# Patient Record
Sex: FEMALE | Race: WHITE | NOT HISPANIC OR LATINO | Employment: OTHER | URBAN - METROPOLITAN AREA
[De-identification: names, ages, dates, MRNs, and addresses within clinical notes are randomized per-mention and may not be internally consistent; named-entity substitution may affect disease eponyms.]

---

## 2024-01-02 ENCOUNTER — TELEPHONE (OUTPATIENT)
Dept: CARDIOLOGY | Facility: CLINIC | Age: 69
End: 2024-01-02

## 2024-01-02 NOTE — TELEPHONE ENCOUNTER
Caller: Rima Cardona    Relationship: Self    Best call back number: [phone]    What was the call regarding: PT CALLED IN STATING SHE HAS A NEW PT APPT ON 1.9.24 AND SHE HAD RECEIVED A TEXT TO GET REGISTRATION FILLED OUT, PT CAN'T SEE AND WONT BE FILLING THAT OUT OVER THE PHONE, SHE JUST WANTED TO MAKE US AWARE.     Is it okay if the provider responds through MyChart:

## 2024-01-09 ENCOUNTER — OFFICE VISIT (OUTPATIENT)
Dept: CARDIOLOGY | Facility: CLINIC | Age: 69
End: 2024-01-09
Payer: MEDICARE

## 2024-01-09 VITALS
BODY MASS INDEX: 31.47 KG/M2 | SYSTOLIC BLOOD PRESSURE: 174 MMHG | DIASTOLIC BLOOD PRESSURE: 90 MMHG | WEIGHT: 177.6 LBS | HEIGHT: 63 IN | HEART RATE: 65 BPM

## 2024-01-09 DIAGNOSIS — I10 HYPERTENSION, ESSENTIAL: ICD-10-CM

## 2024-01-09 DIAGNOSIS — I48.0 PAF (PAROXYSMAL ATRIAL FIBRILLATION): Primary | ICD-10-CM

## 2024-01-09 PROCEDURE — 99204 OFFICE O/P NEW MOD 45 MIN: CPT | Performed by: INTERNAL MEDICINE

## 2024-01-09 PROCEDURE — 1159F MED LIST DOCD IN RCRD: CPT | Performed by: INTERNAL MEDICINE

## 2024-01-09 PROCEDURE — 1160F RVW MEDS BY RX/DR IN RCRD: CPT | Performed by: INTERNAL MEDICINE

## 2024-01-09 RX ORDER — CHLORTHALIDONE 25 MG/1
25 TABLET ORAL DAILY
COMMUNITY

## 2024-01-09 RX ORDER — ACETAMINOPHEN 80 MG/1
80 TABLET, CHEWABLE ORAL EVERY 4 HOURS PRN
COMMUNITY

## 2024-01-09 RX ORDER — POTASSIUM CHLORIDE 20MEQ/15ML
LIQUID (ML) ORAL DAILY
COMMUNITY

## 2024-01-09 RX ORDER — ALENDRONATE SODIUM 70 MG/75ML
70 SOLUTION ORAL
COMMUNITY

## 2024-01-09 RX ORDER — LATANOPROST 50 UG/ML
1 SOLUTION/ DROPS OPHTHALMIC NIGHTLY
COMMUNITY
Start: 2023-11-28

## 2024-01-09 RX ORDER — MELATONIN 10 MG
CAPSULE ORAL
COMMUNITY

## 2024-01-09 RX ORDER — INSULIN GLARGINE 300 U/ML
6 INJECTION, SOLUTION SUBCUTANEOUS DAILY
COMMUNITY

## 2024-01-09 RX ORDER — METOPROLOL SUCCINATE 50 MG/1
50 TABLET, EXTENDED RELEASE ORAL DAILY
COMMUNITY

## 2024-01-09 NOTE — PROGRESS NOTES
Chief Complaint  Atrial Fibrillation and new patient    Subjective            Rima Cardona presents to [facility] CARDIOLOG[facility]  Atrial Fibrillation  Symptoms include palpitations and shortness of breath. Symptoms are negative for chest pain. Past medical history includes atrial fibrillation.       68-year-old white female.  She has no previous cardiac history.  She had a routine follow-up with primary care recently but had not been feeling well over the past couple of weeks.  She reported fatigue, a little bit of cough and just feeling tired.  She had an EKG at that visit which showed atrial fibrillation with rapid rates.  She was nonspecifically having palpitations.  She was started on Eliquis and Toprol at that point.  She has felt a bit better but did experience some palpitations yesterday.  Her home blood pressure readings have been reasonably well-controlled and a couple of times her digital cuff caught her pulse rate in the 120s to 140s but the rest of the last couple weeks it has been in 50s to 70s.    PMH  Past Medical History:   Diagnosis Date    Atrial fibrillation     Diabetes mellitus          SURGICALHX  History reviewed. No pertinent surgical history.     SOC  Social History     Socioeconomic History    Marital status:    Tobacco Use    Smoking status: Former     Types: Cigarettes     Quit date:      Years since quittin.0    Smokeless tobacco: Never   Vaping Use    Vaping Use: Never used   Substance and Sexual Activity    Alcohol use: Never    Drug use: Never    Sexual activity: Defer         FAMHX  Family History   Problem Relation Age of Onset    Cancer Mother     No Known Problems Father           ALLERGY  Allergies   Allergen Reactions    Cefaclor Diarrhea and Other (See Comments)        MEDSCURRENT    Current Outpatient Medications:     acetaminophen (TYLENOL) 80 MG chewable tablet, Chew 1 tablet Every 4 (Four) Hours As Needed for Mild Pain., Disp: , Rfl:      "alendronate (FOSAMAX) 70 MG/75ML solution, Take 75 mL by mouth Every 7 (Seven) Days., Disp: , Rfl:     ALLOPURINOL PO, Take 50 mg by mouth 1 (One) Time., Disp: , Rfl:     apixaban (ELIQUIS) 5 MG tablet tablet, Take 1 tablet by mouth 2 (Two) Times a Day., Disp: , Rfl:     chlorthalidone (HYGROTON) 25 MG tablet, Take 1 tablet by mouth Daily., Disp: , Rfl:     Insulin Glargine, 2 Unit Dial, (Toujeo Max SoloStar) 300 UNIT/ML solution pen-injector injection, Inject 6 Units under the skin into the appropriate area as directed Daily., Disp: , Rfl:     latanoprost (XALATAN) 0.005 % ophthalmic solution, Administer 1 drop to both eyes Every Night., Disp: , Rfl:     Melatonin 10 MG capsule, Take  by mouth., Disp: , Rfl:     metoprolol succinate XL (TOPROL-XL) 50 MG 24 hr tablet, Take 1 tablet by mouth Daily., Disp: , Rfl:     potassium chloride (KAYCIEL) 20 mEq/15 mL solution, Take  by mouth Daily., Disp: , Rfl:       Review of Systems   Constitutional: Positive for malaise/fatigue.   HENT: Negative.     Eyes: Negative.    Cardiovascular:  Positive for dyspnea on exertion and palpitations. Negative for chest pain.   Respiratory:  Positive for shortness of breath.    Endocrine: Negative.    Hematologic/Lymphatic: Negative.    Skin: Negative.    Musculoskeletal: Negative.    Gastrointestinal: Negative.    Genitourinary: Negative.    Neurological: Negative.    Psychiatric/Behavioral: Negative.          Objective     /90   Pulse 65   Ht 160 cm (63\")   Wt 80.6 kg (177 lb 9.6 oz)   BMI 31.46 kg/m²       General Appearance:   well developed  well nourished  HENT:   oropharynx moist  lips not cyanotic  Neck:  thyroid not enlarged  supple  Respiratory:  no respiratory distress  normal breath sounds  no rales  Cardiovascular:  no jugular venous distention  regular rhythm  apical impulse normal  S1 normal, S2 normal  no S3, no S4   no murmur  no rub, no thrill  carotid pulses normal; no bruit  pedal pulses normal  lower " extremity edema: none    Musculoskeletal:  no clubbing of fingers.   normocephalic, head atraumatic  Skin:   warm, dry  Psychiatric:  judgement and insight appropriate  normal mood and affect      Result Review :             Data reviewed : Primary care records reviewed, laboratory studies reviewed, TSH normal, , creatinine 1.3, BUN 36.  EKG December 2023 showed atrial fibrillation with rapid ventricular rates       Procedures             Assessment and Plan        ASSESSMENT:  Encounter Diagnoses   Name Primary?    PAF (paroxysmal atrial fibrillation) Yes    Hypertension, essential          PLAN:    1.  Paroxysmal atrial fibrillation-she is clinically in sinus rhythm today but has probably had a couple of breakthroughs over the past couple of weeks.  A 30-day event monitor will be scheduled to identify how much atrial fibrillation she is having.  For now continue the beta-blocker and long-term Eliquis.  An echo will be scheduled to evaluate for structural abnormalities.  2.  Essential hypertension-Home readings are reasonably well-controlled.  The patient is quite anxious about the diagnosis of atrial fibrillation.  I told her to continue to monitor her blood pressure at home and she will do a follow-up here in the office in about 6 weeks after her diagnostics.  She is agreeable with this plan  3.  I have encouraged her to return to her normal activities otherwise at this point.  She had been avoiding physical activity and walking because of the diagnosis of atrial fibrillation.          Patient was given instructions and counseling regarding her condition or for health maintenance advice. Please see specific information pulled into the AVS if appropriate.             VJ Jolly MD  1/9/2024    14:23 EST

## 2024-01-10 ENCOUNTER — TELEPHONE (OUTPATIENT)
Dept: CARDIOLOGY | Facility: CLINIC | Age: 69
End: 2024-01-10
Payer: MEDICARE

## 2024-01-10 NOTE — TELEPHONE ENCOUNTER
Received notification of serious event occurring on 1/10/2024 at 9:32 AM. Event is identified as afib with maximum heart rate of 147 bpm.

## 2024-01-10 NOTE — TELEPHONE ENCOUNTER
Yes, she is having another episode of atrial fibrillation.  We discussed this in the office just yesterday.  I want her to take an extra 25 mg of metoprolol now, and starting tomorrow take 50 mg in the morning and 25 mg in the evening every day.  She is already on Eliquis which she will continue.    Follow-up as scheduled

## 2024-01-10 NOTE — TELEPHONE ENCOUNTER
TERE patient. Patient states at time she was getting dressed and her heart started racing. Patient states now she is very fatigued. Patient states she took her Toprol 50 mg this AM

## 2024-01-22 ENCOUNTER — TELEPHONE (OUTPATIENT)
Dept: CARDIOLOGY | Facility: CLINIC | Age: 69
End: 2024-01-22
Payer: MEDICARE

## 2024-01-22 NOTE — TELEPHONE ENCOUNTER
Received notification of serious events, both occurring on 1/22/2024. Events are both identified as atrial fibrillation and are outlined below.    1:24 PM - maximum heart rate 158 bpm, no duration specified    1:31 PM - maximum heart rate 164 bpm, no duration specified    The patient has a history of afib, which is the indication for the study.

## 2024-01-23 RX ORDER — METOPROLOL SUCCINATE 50 MG/1
TABLET, EXTENDED RELEASE ORAL
Qty: 225 TABLET | Refills: 3 | Status: SHIPPED | OUTPATIENT
Start: 2024-01-23

## 2024-01-23 NOTE — TELEPHONE ENCOUNTER
TERE patient. Patient states her heart was racing and fluttering. Patient states it feels like they are lasting longer.  Patient takes:  Toprol 50 mg AM 25 mg PM  Eliquis 5 mg BID

## 2024-01-23 NOTE — TELEPHONE ENCOUNTER
Caller: Rima Cardona    Relationship: Self    Best call back number: [phone] (home)      What is the best time to reach you: ANYTIME     Who are you requesting to speak with (clinical staff, provider,  specific staff member): CLINICAL     Do you know the name of the person who called: KATHIE     What was the call regarding: FOLLOW UP FROM EPISODE PATIENT HAD ON [email or web address].HUB TRANSFERRED PATIENT TO OFFICE AS WEL    Is it okay if the provider responds through MyChart: NO

## 2024-01-26 ENCOUNTER — TELEPHONE (OUTPATIENT)
Dept: CARDIOLOGY | Facility: CLINIC | Age: 69
End: 2024-01-26
Payer: MEDICARE

## 2024-01-26 DIAGNOSIS — I48.0 PAF (PAROXYSMAL ATRIAL FIBRILLATION): ICD-10-CM

## 2024-01-29 ENCOUNTER — TELEPHONE (OUTPATIENT)
Dept: CARDIOLOGY | Facility: CLINIC | Age: 69
End: 2024-01-29
Payer: MEDICARE

## 2024-01-29 NOTE — TELEPHONE ENCOUNTER
----- Message from VJ Jolly MD sent at 1/27/2024  2:02 PM EST -----  Echo reviewed  Heart function was normal  Valve function was normal    Await event monitor  Otherwise follow-up as scheduled

## 2024-01-30 ENCOUNTER — TELEPHONE (OUTPATIENT)
Dept: CARDIOLOGY | Facility: CLINIC | Age: 69
End: 2024-01-30
Payer: MEDICARE

## 2024-01-30 NOTE — TELEPHONE ENCOUNTER
RECEIVED NOTIFICATION FROM [employer] REQUESTING MISSING INFORMATION.  RE FAXED APPLICATION.  WAITING ON DETERMINATION.

## 2024-02-15 ENCOUNTER — TELEPHONE (OUTPATIENT)
Dept: CARDIOLOGY | Facility: CLINIC | Age: 69
End: 2024-02-15
Payer: MEDICARE

## 2024-02-19 ENCOUNTER — OFFICE VISIT (OUTPATIENT)
Dept: CARDIOLOGY | Facility: CLINIC | Age: 69
End: 2024-02-19
Payer: MEDICARE

## 2024-02-19 VITALS
WEIGHT: 177 LBS | BODY MASS INDEX: 31.36 KG/M2 | OXYGEN SATURATION: 98 % | HEIGHT: 63 IN | SYSTOLIC BLOOD PRESSURE: 148 MMHG | DIASTOLIC BLOOD PRESSURE: 83 MMHG | HEART RATE: 60 BPM

## 2024-02-19 DIAGNOSIS — I10 HYPERTENSION, ESSENTIAL: ICD-10-CM

## 2024-02-19 DIAGNOSIS — I48.0 PAF (PAROXYSMAL ATRIAL FIBRILLATION): Primary | ICD-10-CM

## 2024-02-19 PROCEDURE — 99214 OFFICE O/P EST MOD 30 MIN: CPT | Performed by: NURSE PRACTITIONER

## 2024-02-19 PROCEDURE — G2211 COMPLEX E/M VISIT ADD ON: HCPCS | Performed by: NURSE PRACTITIONER

## 2024-02-19 PROCEDURE — 1159F MED LIST DOCD IN RCRD: CPT | Performed by: NURSE PRACTITIONER

## 2024-02-19 PROCEDURE — 1160F RVW MEDS BY RX/DR IN RCRD: CPT | Performed by: NURSE PRACTITIONER

## 2024-02-19 RX ORDER — METOPROLOL SUCCINATE 50 MG/1
TABLET, EXTENDED RELEASE ORAL
Start: 2024-02-19

## 2024-02-19 RX ORDER — METOPROLOL SUCCINATE 100 MG/1
100 TABLET, EXTENDED RELEASE ORAL DAILY
Start: 2024-02-19

## 2024-02-19 NOTE — PROGRESS NOTES
Chief Complaint  Atrial Fibrillation and Follow-up (6 week )    Subjective            Rima Cardona presents to [facility] CARDIOLOG[facility]  History of Present Illness    Ms. Bustillos is here today for follow-up evaluation management of paroxysmal atrial fibrillation, essential hypertension.  She recently presented to primary care for routine annual follow-up, she had an EKG completed which showed atrial fibrillation with rapid rates.  Since that time she has been started on Eliquis and Toprol.  She wore a 30-day event monitor recently which showed an overall 1.9% atrial fibrillation burden, longest episode up to 15 minutes.  2 symptoms correlated to A-fib.  Echocardiogram showed normal LV systolic function, normal valve function.  Her metoprolol has been adjusted however she continues to have breakthrough episodes.  She reports about 2 in the last 2 weeks.  1 of those seem to have been triggered by a very stressful phone conversation.  She is compliant with her medical therapy and has no bleeding problems on anticoagulation.  She is overall very anxious regarding the diagnosis.  She has limited her activity quite a bit, she is eating poorly, and has gained 20 pounds.    PMH  Past Medical History:   Diagnosis Date    Atrial fibrillation     Diabetes mellitus          SURGICALHX  History reviewed. No pertinent surgical history.     SOC  Social History     Socioeconomic History    Marital status:    Tobacco Use    Smoking status: Former     Types: Cigarettes     Quit date:      Years since quittin.1    Smokeless tobacco: Never   Vaping Use    Vaping Use: Never used   Substance and Sexual Activity    Alcohol use: Never    Drug use: Never    Sexual activity: Defer         FAMHX  Family History   Problem Relation Age of Onset    Cancer Mother     No Known Problems Father           ALLERGY  Allergies   Allergen Reactions    Cefaclor Diarrhea and Other (See Comments)     "    MEDSCURRENT    Current Outpatient Medications:     acetaminophen (TYLENOL) 80 MG chewable tablet, Chew 1 tablet Every 4 (Four) Hours As Needed for Mild Pain., Disp: , Rfl:     alendronate (FOSAMAX) 70 MG/75ML solution, Take 75 mL by mouth Every 7 (Seven) Days., Disp: , Rfl:     ALLOPURINOL PO, Take 50 mg by mouth 1 (One) Time., Disp: , Rfl:     apixaban (ELIQUIS) 5 MG tablet tablet, Take 1 tablet by mouth 2 (Two) Times a Day., Disp: , Rfl:     chlorthalidone (HYGROTON) 25 MG tablet, Take 1 tablet by mouth Daily., Disp: , Rfl:     Insulin Glargine, 2 Unit Dial, (Toujeo Max SoloStar) 300 UNIT/ML solution pen-injector injection, Inject 6 Units under the skin into the appropriate area as directed Daily., Disp: , Rfl:     latanoprost (XALATAN) 0.005 % ophthalmic solution, Administer 1 drop to both eyes Every Night., Disp: , Rfl:     Melatonin 10 MG capsule, Take  by mouth., Disp: , Rfl:     metoprolol succinate XL (TOPROL-XL) 50 MG 24 hr tablet, 75 mg by mouth in morning 50 mg by mouth at night, Disp: 225 tablet, Rfl: 3    potassium chloride (KAYCIEL) 20 mEq/15 mL solution, Take  by mouth Daily., Disp: , Rfl:       Review of Systems   Cardiovascular:  Positive for irregular heartbeat and palpitations. Negative for chest pain, dyspnea on exertion, near-syncope and syncope.   Hematologic/Lymphatic: Negative for bleeding problem.        Objective     /87   Pulse 62   Ht 160 cm (63\")   Wt 80.3 kg (177 lb)   SpO2 99%   BMI 31.35 kg/m²       General Appearance:   well developed  well nourished  HENT:   oropharynx moist  lips not cyanotic  Neck:  thyroid not enlarged  supple  Respiratory:  no respiratory distress  normal breath sounds  no rales  Cardiovascular:  no jugular venous distention  regular rhythm  apical impulse normal  S1 normal, S2 normal  no S3, no S4   no murmur  no rub, no thrill  carotid pulses normal; no bruit  pedal pulses normal  lower extremity edema: none    Musculoskeletal:  no clubbing of " fingers.   normocephalic, head atraumatic  Skin:   warm, dry  Psychiatric:  judgement and insight appropriate  normal mood and affect      Result Review :     The following data was reviewed by: MANUEL Thrasher on 02/19/2024:              Data reviewed : Cardiology studies echo and event monitor reviewed above.      Procedures      Rima Cardona  reports that she quit smoking about 28 years ago. Her smoking use included cigarettes. She has never used smokeless tobacco.. I have educated her on the risk of diseases from using tobacco products such as cancer, COPD, and heart disease.              Assessment and Plan        ASSESSMENT:  Encounter Diagnoses   Name Primary?    PAF (paroxysmal atrial fibrillation) Yes    Hypertension, essential          PLAN:    1.  Paroxysmal atrial fibrillation-overall burden low on event monitor however she has occasional breakthrough episodes still.  We discussed increasing her morning Toprol dose 100 mg and she will continue 50 in the evening.  It seems like the episodes are occurring most often midday so this may offset that a bit.  She has become very sedentary secondary to this diagnosis, we discussed that she will resume her normal activities, exercise, etc.  Before this she was very active overall.  She is also going to work on her diet and intends to lose weight.  Continue anticoagulation.  No structural concerns on echo.  2.  Essential hypertension-slightly elevated today.  Increasing Toprol should help with this.  3.  Follow-up in 6 weeks          Patient was given instructions and counseling regarding her condition or for health maintenance advice. Please see specific information pulled into the AVS if appropriate.           MANUEL Thrasher   2/19/2024  09:59 EST

## 2024-02-22 ENCOUNTER — TELEPHONE (OUTPATIENT)
Dept: CARDIOLOGY | Facility: CLINIC | Age: 69
End: 2024-02-22
Payer: MEDICARE

## 2024-02-22 NOTE — TELEPHONE ENCOUNTER
The [facility] received a fax that requires your attention. The document has been indexed to the patient’s chart for your review.      Reason for sending: EXTERNAL MEDICAL RECORD NOTIFICATION     Documents Description: MEDS-BMS PAF APPROVAL-2.22.24    Name of Sender: [employer] PA[employer]     Date Indexed: 2.22.24

## 2024-02-23 ENCOUNTER — TELEPHONE (OUTPATIENT)
Dept: CARDIOLOGY | Facility: CLINIC | Age: 69
End: 2024-02-23
Payer: MEDICARE

## 2024-02-23 NOTE — TELEPHONE ENCOUNTER
Caller: Rima Cardona    Relationship: Self    Best call back number: [phone]    What is the best time to reach you: ANY    Who are you requesting to speak with (clinical staff, provider,  specific staff member): ANY    Do you know the name of the person who called:     What was the call regarding: THE [facility] CALLED AND HER SHIPMENT WILL BE THERE 2-27-24    Is it okay if the provider responds through MyChart:

## 2024-04-03 ENCOUNTER — OFFICE VISIT (OUTPATIENT)
Dept: CARDIOLOGY | Facility: CLINIC | Age: 69
End: 2024-04-03
Payer: MEDICARE

## 2024-04-03 VITALS
HEART RATE: 56 BPM | HEIGHT: 63 IN | DIASTOLIC BLOOD PRESSURE: 90 MMHG | SYSTOLIC BLOOD PRESSURE: 159 MMHG | BODY MASS INDEX: 34.91 KG/M2 | WEIGHT: 197 LBS

## 2024-04-03 DIAGNOSIS — I10 HYPERTENSION, ESSENTIAL: ICD-10-CM

## 2024-04-03 DIAGNOSIS — I48.0 PAF (PAROXYSMAL ATRIAL FIBRILLATION): Primary | ICD-10-CM

## 2024-04-03 RX ORDER — METOPROLOL SUCCINATE 50 MG/1
150 TABLET, EXTENDED RELEASE ORAL DAILY
Qty: 270 TABLET | Refills: 3
Start: 2024-04-03 | End: 2024-04-04 | Stop reason: SDUPTHER

## 2024-04-03 NOTE — PROGRESS NOTES
Chief Complaint  Atrial Fibrillation (No symptoms)    Subjective            Rima Cardona presents to [facility] CARDIOLOG[facility]  History of Present Illness    Ms. Bustillos is here for follow-up evaluation management of paroxysmal atrial fibrillation, essential hypertension.  Recent 30-day event monitor showed 1.9% atrial fibrillation, longest episode up to 15 minutes.  Symptoms correlated to A-fib.  Echo showed normal LV function.  She has been started on anticoagulation and beta-blocker therapy which I adjusted at last visit.  Since then she has had only 1, brief episode of A-fib lasting less than 5 minutes.  She reports weight gain due to being very sedentary recently and eating poorly.      PMH  Past Medical History:   Diagnosis Date    Atrial fibrillation     Diabetes mellitus          SURGICALHX  History reviewed. No pertinent surgical history.     SOC  Social History     Socioeconomic History    Marital status:    Tobacco Use    Smoking status: Former     Current packs/day: 0.00     Types: Cigarettes     Quit date:      Years since quittin.2    Smokeless tobacco: Never   Vaping Use    Vaping status: Never Used   Substance and Sexual Activity    Alcohol use: Never    Drug use: Never    Sexual activity: Defer         FAMHX  Family History   Problem Relation Age of Onset    Cancer Mother     No Known Problems Father           ALLERGY  Allergies   Allergen Reactions    Cefaclor Diarrhea and Other (See Comments)        MEDSCURRENT    Current Outpatient Medications:     acetaminophen (TYLENOL) 80 MG chewable tablet, Chew 1 tablet Every 4 (Four) Hours As Needed for Mild Pain., Disp: , Rfl:     alendronate (FOSAMAX) 70 MG/75ML solution, Take 75 mL by mouth Every 7 (Seven) Days., Disp: , Rfl:     ALLOPURINOL PO, Take 50 mg by mouth 1 (One) Time., Disp: , Rfl:     apixaban (ELIQUIS) 5 MG tablet tablet, Take 1 tablet by mouth 2 (Two) Times a Day., Disp: , Rfl:     chlorthalidone (HYGROTON)  "25 MG tablet, Take 1 tablet by mouth Daily., Disp: , Rfl:     Insulin Glargine, 2 Unit Dial, (Toujeo Max SoloStar) 300 UNIT/ML solution pen-injector injection, Inject 20 Units under the skin into the appropriate area as directed Daily., Disp: , Rfl:     latanoprost (XALATAN) 0.005 % ophthalmic solution, Administer 1 drop to both eyes Every Night., Disp: , Rfl:     Melatonin 10 MG capsule, Take  by mouth., Disp: , Rfl:     metoprolol succinate XL (TOPROL-XL) 50 MG 24 hr tablet, Take 3 tablets by mouth Daily. 100 mg and 50 mg in the evening., Disp: 270 tablet, Rfl: 3    potassium chloride (KAYCIEL) 20 mEq/15 mL solution, Take  by mouth Daily., Disp: , Rfl:       Review of Systems   Constitutional: Positive for weight gain.   Cardiovascular:  Positive for palpitations. Negative for chest pain, dyspnea on exertion, leg swelling, near-syncope and syncope.   Respiratory:  Negative for shortness of breath.    Hematologic/Lymphatic: Negative for bleeding problem.        Objective     /90   Pulse 56   Ht 160 cm (63\")   Wt 89.4 kg (197 lb)   BMI 34.90 kg/m²       General Appearance:   well developed  well nourished  HENT:   oropharynx moist  lips not cyanotic  Neck:  thyroid not enlarged  supple  Respiratory:  no respiratory distress  normal breath sounds  no rales  Cardiovascular:  no jugular venous distention  regular rhythm  apical impulse normal  S1 normal, S2 normal  no S3, no S4   no murmur  no rub, no thrill  carotid pulses normal; no bruit  pedal pulses normal  lower extremity edema: none    Musculoskeletal:  no clubbing of fingers.   normocephalic, head atraumatic  Skin:   warm, dry  Psychiatric:  judgement and insight appropriate  normal mood and affect      Result Review :     The following data was reviewed by: MANUEL Thrasher on 04/03/2024:              Data reviewed : Cardiology studies Holter monitor and echo reviewed above.      Procedures      Rima Cardona  reports that she quit " smoking about 28 years ago. Her smoking use included cigarettes. She has never used smokeless tobacco. I have educated her on the risk of diseases from using tobacco products such as cancer, COPD, and heart disease.              Assessment and Plan        ASSESSMENT:  Encounter Diagnoses   Name Primary?    PAF (paroxysmal atrial fibrillation) Yes    Hypertension, essential          PLAN:    1.  Paroxysmal atrial fibrillation-mostly maintaining sinus rhythm.  In the last 6 weeks she has only had 1 episode of brief A-fib lasting less than 5 minutes.  Continue beta-blocker therapy and anticoagulation.  2.  Essential hypertension-elevated today in the office.  She reports significant whitecoat hypertension.  I encouraged her to monitor at home and notify the office if consistently greater than 140/90.  3.  Reported greater than 20 pound weight gain since being diagnosed with A-fib.  She was very fearful initially and was significantly limiting her activity.  More recently she is trying to be more active.  I encouraged her to resume her normal activities, exercise regimen, and caloric monitoring.    Routine follow-up in 6 months.      Patient was given instructions and counseling regarding her condition or for health maintenance advice. Please see specific information pulled into the AVS if appropriate.           Erin Montoya, APRN   4/3/2024  11:08 EDT

## 2024-04-04 ENCOUNTER — TELEPHONE (OUTPATIENT)
Dept: CARDIOLOGY | Facility: CLINIC | Age: 69
End: 2024-04-04
Payer: MEDICARE

## 2024-04-04 RX ORDER — METOPROLOL SUCCINATE 50 MG/1
150 TABLET, EXTENDED RELEASE ORAL DAILY
Start: 2024-04-04

## 2024-04-04 NOTE — TELEPHONE ENCOUNTER
Caller: Rima Cardona    Relationship: Self    Best call back number: [phone]    What is the best time to reach you: ANY    Who are you requesting to speak with (clinical staff, provider,  specific staff member): CLINICAL    What was the call regarding: PT IS NEEDING HER NEW MEDICATION SENT TO [facility], STATED SHE CALLED YESTERDAY AFTERNOON AND THEY STILL HAVE NOT RECEIVED IT. PLEASE CALL PT ONCE ITS BEEN SENT OVER, THANK YOU!    Is it okay if the provider responds through [employer]: PLEASE CALL

## 2024-04-09 RX ORDER — METOPROLOL SUCCINATE 50 MG/1
150 TABLET, EXTENDED RELEASE ORAL DAILY
Qty: 270 TABLET | Refills: 3 | Status: SHIPPED | OUTPATIENT
Start: 2024-04-09

## 2024-10-08 ENCOUNTER — OFFICE VISIT (OUTPATIENT)
Dept: CARDIOLOGY | Facility: CLINIC | Age: 69
End: 2024-10-08
Payer: MEDICARE

## 2024-10-08 VITALS
SYSTOLIC BLOOD PRESSURE: 153 MMHG | BODY MASS INDEX: 34.91 KG/M2 | HEIGHT: 63 IN | WEIGHT: 197 LBS | HEART RATE: 61 BPM | DIASTOLIC BLOOD PRESSURE: 79 MMHG

## 2024-10-08 DIAGNOSIS — E66.01 MORBID (SEVERE) OBESITY DUE TO EXCESS CALORIES: ICD-10-CM

## 2024-10-08 DIAGNOSIS — I48.0 PAF (PAROXYSMAL ATRIAL FIBRILLATION): Primary | ICD-10-CM

## 2024-10-08 DIAGNOSIS — I10 HYPERTENSION, ESSENTIAL: ICD-10-CM

## 2024-10-08 PROCEDURE — 1159F MED LIST DOCD IN RCRD: CPT | Performed by: INTERNAL MEDICINE

## 2024-10-08 PROCEDURE — 99214 OFFICE O/P EST MOD 30 MIN: CPT | Performed by: INTERNAL MEDICINE

## 2024-10-08 PROCEDURE — 1160F RVW MEDS BY RX/DR IN RCRD: CPT | Performed by: INTERNAL MEDICINE

## 2024-10-08 RX ORDER — DAPAGLIFLOZIN 5 MG/1
5 TABLET, FILM COATED ORAL DAILY
COMMUNITY

## 2024-10-08 NOTE — PROGRESS NOTES
Chief Complaint  Atrial Fibrillation and Follow-up (6 month )    Subjective            Rima Cardona presents to [facility] CARDIOLOGY      Ms. Bustillos is here for follow-up evaluation management of paroxysmal atrial fibrillation, essential hypertension.  Overall she is doing well.  She had some self-limited palpitations since the last follow-up visit but nothing that was very sustained.  She is compliant with her medical therapy.  No significant bleeding problems on anticoagulation.    PMH  Past Medical History:   Diagnosis Date    Atrial fibrillation     Diabetes mellitus          SURGICALHX  History reviewed. No pertinent surgical history.     SOC  Social History     Socioeconomic History    Marital status:    Tobacco Use    Smoking status: Former     Current packs/day: 0.00     Types: Cigarettes     Quit date:      Years since quittin.7    Smokeless tobacco: Never   Vaping Use    Vaping status: Never Used   Substance and Sexual Activity    Alcohol use: Never    Drug use: Never    Sexual activity: Defer         FAMHX  Family History   Problem Relation Age of Onset    Cancer Mother     No Known Problems Father           ALLERGY  Allergies   Allergen Reactions    Cefaclor Diarrhea and Other (See Comments)        MEDSCURRENT    Current Outpatient Medications:     acetaminophen (TYLENOL) 80 MG chewable tablet, Chew 1 tablet Every 4 (Four) Hours As Needed for Mild Pain., Disp: , Rfl:     alendronate (FOSAMAX) 70 MG/75ML solution, Take 75 mL by mouth Every 7 (Seven) Days., Disp: , Rfl:     ALLOPURINOL PO, Take 50 mg by mouth 1 (One) Time., Disp: , Rfl:     apixaban (ELIQUIS) 5 MG tablet tablet, Take 1 tablet by mouth 2 (Two) Times a Day., Disp: , Rfl:     chlorthalidone (HYGROTON) 25 MG tablet, Take 1 tablet by mouth Daily., Disp: , Rfl:     dapagliflozin (Farxiga) 5 MG tablet tablet, Take 1 tablet by mouth Daily. Taking as samples right now, Disp: , Rfl:     Insulin Glargine, 2 Unit  "Dial, (Demi LindaoStar) 300 UNIT/ML solution pen-injector injection, Inject 20 Units under the skin into the appropriate area as directed Daily., Disp: , Rfl:     latanoprost (XALATAN) 0.005 % ophthalmic solution, Administer 1 drop to both eyes Every Night., Disp: , Rfl:     Melatonin 10 MG capsule, Take  by mouth., Disp: , Rfl:     metoprolol succinate XL (TOPROL-XL) 50 MG 24 hr tablet, Take 3 tablets by mouth Daily. 100 mg and 50 mg in the evening., Disp: 270 tablet, Rfl: 3    potassium chloride (KAYCIEL) 20 mEq/15 mL solution, Take  by mouth Daily., Disp: , Rfl:       Review of Systems   Constitutional: Positive for weight gain.   Cardiovascular:  Positive for palpitations. Negative for chest pain, dyspnea on exertion, leg swelling, near-syncope and syncope.   Respiratory:  Negative for shortness of breath.    Hematologic/Lymphatic: Negative for bleeding problem.        Objective     /79   Pulse 61   Ht 160 cm (63\")   Wt 89.4 kg (197 lb)   BMI 34.90 kg/m²       General Appearance:   well developed  well nourished  HENT:   oropharynx moist  lips not cyanotic  Neck:  thyroid not enlarged  supple  Respiratory:  no respiratory distress  normal breath sounds  no rales  Cardiovascular:  no jugular venous distention  regular rhythm  apical impulse normal  S1 normal, S2 normal  no S3, no S4   no murmur  no rub, no thrill  carotid pulses normal; no bruit  pedal pulses normal  lower extremity edema: none    Musculoskeletal:  no clubbing of fingers.   normocephalic, head atraumatic  Skin:   warm, dry  Psychiatric:  judgement and insight appropriate  normal mood and affect      Result Review :     The following data was reviewed by: Ray Jolly MD on 04/03/2024:              Data reviewed : Cardiology studies Holter monitor and echo reviewed above.      Procedures           Assessment and Plan        ASSESSMENT:  Encounter Diagnoses   Name Primary?    PAF (paroxysmal atrial fibrillation) Yes    " Hypertension, essential     Morbid (severe) obesity due to excess calories          PLAN:    1.  Paroxysmal atrial fibrillation-the patient is clinically stable, seems to be maintaining sinus rhythm.  Continue beta-blocker and anticoagulation  2.  Essential hypertension-elevated today in the office.  Was 130/72 at nephrology less than 3 weeks ago.  Continue home monitoring  3.  Morbid obesity due to excess calories-weight loss counseling    Routine follow-up in about 8 months      Patient was given instructions and counseling regarding her condition or for health maintenance advice. Please see specific information pulled into the AVS if appropriate.           Ray Jolly MD   10/8/2024  13:28 EDT

## 2024-11-05 ENCOUNTER — TELEPHONE (OUTPATIENT)
Dept: CARDIOLOGY | Facility: CLINIC | Age: 69
End: 2024-11-05
Payer: MEDICARE

## 2024-11-05 NOTE — TELEPHONE ENCOUNTER
The [facility] received a fax that requires your attention. The document has been indexed to the patient’s chart for your review.      Reason for sending: EXTERNAL MEDICAL RECORD NOTIFICATION     Documents Description: ASSISTANCE ENDING-DARREN PAF-11.4.24    Name of Sender: DARREN ANDREWS     Date Indexed: 11.4.24

## 2025-02-21 ENCOUNTER — TELEPHONE (OUTPATIENT)
Dept: CARDIOLOGY | Facility: CLINIC | Age: 70
End: 2025-02-21
Payer: MEDICARE

## 2025-02-21 NOTE — TELEPHONE ENCOUNTER
Caller: Rima Cardona    Relationship: Self    Best call back number: [phone]    What form or medical record are you requesting: NAKIA PAPERWORK    Who is requesting this form or medical record from you: JOSE CRUZ DECKER    How would you like to receive the form or medical records (pick-up, mail, fax): FAX  If fax, what is the fax number: UNKNOWN    Timeframe paperwork needed: ASAP    Additional notes: PT STATES THAT PAPERWORK IS COMING THROUGH [employer] FOR JOSE CRUZ DECKER. JOSE CRUZ DECKER REQUESTS THAT THEY RESEND THE PAPERWORK. PT DID NOT KNOW FAX NUMBER.

## 2025-02-25 ENCOUNTER — TELEPHONE (OUTPATIENT)
Dept: CARDIOLOGY | Facility: CLINIC | Age: 70
End: 2025-02-25

## 2025-02-25 NOTE — TELEPHONE ENCOUNTER
The [facility] received a fax that requires your attention. The document has been indexed to the patient’s chart for your review.      Reason for sending: EXTERNAL MEDICAL RECORD NOTIFICATION     Documents Description: DENIAL-[facility] PAF-2.25.25    Name of Sender: DARREN ANDREWS     Date Indexed: 2.25.25

## 2025-02-26 NOTE — TELEPHONE ENCOUNTER
Will call pt to discuss denial and options.     Called pt, lvm to discuss denial for Eliquis pt asst and MP3.  Left my phone number for a return call.

## 2025-03-12 ENCOUNTER — TELEPHONE (OUTPATIENT)
Dept: CARDIOLOGY | Facility: CLINIC | Age: 70
End: 2025-03-12
Payer: MEDICARE

## 2025-03-12 NOTE — TELEPHONE ENCOUNTER
Pt came into the office today to report being denied patient assistance for Eliquis. We have exceeded all other options to qualify.  Pt is agreeable to try Pradaxa.

## 2025-03-13 RX ORDER — DABIGATRAN ETEXILATE 150 MG/1
150 CAPSULE ORAL 2 TIMES DAILY
Qty: 60 CAPSULE | Refills: 3 | Status: SHIPPED | OUTPATIENT
Start: 2025-03-13 | End: 2025-03-17 | Stop reason: SDUPTHER

## 2025-03-17 ENCOUNTER — TELEPHONE (OUTPATIENT)
Dept: CARDIOLOGY | Facility: CLINIC | Age: 70
End: 2025-03-17
Payer: MEDICARE

## 2025-03-17 RX ORDER — DABIGATRAN ETEXILATE 150 MG/1
150 CAPSULE ORAL 2 TIMES DAILY
Qty: 60 CAPSULE | Refills: 3 | Status: SHIPPED | OUTPATIENT
Start: 2025-03-17

## 2025-03-17 NOTE — TELEPHONE ENCOUNTER
Caller: Rima Cardona    Relationship: Self    Best call back number: [phone]    What is the best time to reach you: ANY    Who are you requesting to speak with (clinical staff, provider,  specific staff member): ABNER BARILLAS MA    What was the call regarding: PT CALLED IN TO RETURN A CALL TO ABNER, MOST LIKELY ABOUT HER PRADAXA. UNABLE TO WT. PLS GIVE HER A CALL BACK, THANK YOU

## 2025-03-17 NOTE — TELEPHONE ENCOUNTER
SPOKE TO PT.  I DID A PA FOR PRADAXA BUT THE PHARMACY TOLD HER IT WAS GOING TO COST $197 FOR A 30 DAY SUPPLY.  SHE CANNOT AFFORD IT.    SHE WANTS TO KNOW IF SHE CAN TAKE A COUPLE OF BABY ASPRIN INSTEAD.      PLEASE ADVISE, THANK YOU.

## 2025-03-17 NOTE — TELEPHONE ENCOUNTER
PA FOR PRADAXA INITIATED, KEY [id number].      PA APPROVED CaseId:[id number];Status:Approved;Review Type:Prior Auth;Coverage Start Date:02/15/2025;Coverage End Date:03/17/2026

## 2025-03-31 RX ORDER — METOPROLOL SUCCINATE 50 MG/1
TABLET, EXTENDED RELEASE ORAL
Qty: 270 TABLET | Refills: 3 | Status: SHIPPED | OUTPATIENT
Start: 2025-03-31

## 2025-03-31 NOTE — TELEPHONE ENCOUNTER
Rx Refill Note  Requested Prescriptions     Pending Prescriptions Disp Refills    metoprolol succinate XL (TOPROL-XL) 50 MG 24 hr tablet [Pharmacy Med Name: METOPROLOL SUCCINATE ER TABS 50MG] 270 tablet 3     Sig: TAKE 2 TABLETS IN THE MORNING AND 1 TABLET IN THE EVENING FOR A TOTAL OF 3 TABLETS PER DAY        LAST OFFICE VISIT:  10/08/2024     NEXT OFFICE VISIT:  6/11/2025     Does the medication requests match the last office note:    [x] Yes   [] No    Does this refill request meet protocol details for MA to approve:     [x] Yes   [] No

## 2025-07-15 ENCOUNTER — TELEPHONE (OUTPATIENT)
Dept: CARDIOLOGY | Facility: CLINIC | Age: 70
End: 2025-07-15
Payer: MEDICARE

## 2025-07-15 NOTE — TELEPHONE ENCOUNTER
Procedure: Colonoscopy and/or EGD     Med Directive: Pradaxa (5 days)     PMH: Paroxysmal atrial fibrillation, HTN     Last Seen: 10/08/2024

## 2025-07-21 ENCOUNTER — OFFICE VISIT (OUTPATIENT)
Dept: CARDIOLOGY | Facility: CLINIC | Age: 70
End: 2025-07-21
Payer: MEDICARE

## 2025-07-21 VITALS
HEIGHT: 63 IN | BODY MASS INDEX: 35.3 KG/M2 | HEART RATE: 63 BPM | SYSTOLIC BLOOD PRESSURE: 170 MMHG | WEIGHT: 199.2 LBS | DIASTOLIC BLOOD PRESSURE: 79 MMHG

## 2025-07-21 DIAGNOSIS — I48.0 PAF (PAROXYSMAL ATRIAL FIBRILLATION): Primary | ICD-10-CM

## 2025-07-21 DIAGNOSIS — I10 HYPERTENSION, ESSENTIAL: ICD-10-CM

## 2025-07-21 DIAGNOSIS — Z01.810 PREOP CARDIOVASCULAR EXAM: ICD-10-CM

## 2025-07-21 PROCEDURE — 1159F MED LIST DOCD IN RCRD: CPT | Performed by: NURSE PRACTITIONER

## 2025-07-21 PROCEDURE — 1160F RVW MEDS BY RX/DR IN RCRD: CPT | Performed by: NURSE PRACTITIONER

## 2025-07-21 PROCEDURE — 93000 ELECTROCARDIOGRAM COMPLETE: CPT | Performed by: NURSE PRACTITIONER

## 2025-07-21 PROCEDURE — 99214 OFFICE O/P EST MOD 30 MIN: CPT | Performed by: NURSE PRACTITIONER

## 2025-07-21 NOTE — PROGRESS NOTES
Chief Complaint  Follow-up (-cardiac clearance, stopping paradoxa ) and Atrial Fibrillation    Subjective            Rima Cardona presents to [facility] CARDIOLOGY  History of Present Illness    Ms. Bustillos is here for follow-up evaluation management of paroxysmal atrial fibrillation, essential hypertension.  She is doing well since her last visit.  She has not had any breakthrough palpitations.  Her blood pressure has been very well-controlled at home.  She denies chest pain or excessive shortness of breath.  No bleeding problems on anticoagulation.    PMH  Past Medical History:   Diagnosis Date    Atrial fibrillation     Diabetes mellitus          SURGICALHX  History reviewed. No pertinent surgical history.     SOC  Social History     Socioeconomic History    Marital status:    Tobacco Use    Smoking status: Former     Current packs/day: 0.00     Types: Cigarettes     Quit date:      Years since quittin.    Smokeless tobacco: Never   Vaping Use    Vaping status: Never Used   Substance and Sexual Activity    Alcohol use: Never    Drug use: Never    Sexual activity: Defer         FAMHX  Family History   Problem Relation Age of Onset    Cancer Mother     No Known Problems Father           ALLERGY  Allergies   Allergen Reactions    Cefaclor Diarrhea and Other (See Comments)        MEDSCURRENT    Current Outpatient Medications:     acetaminophen (TYLENOL) 80 MG chewable tablet, Chew 1 tablet Every 4 (Four) Hours As Needed for Mild Pain., Disp: , Rfl:     alendronate (FOSAMAX) 70 MG/75ML solution, Take 75 mL by mouth Every 7 (Seven) Days., Disp: , Rfl:     ALLOPURINOL PO, Take 50 mg by mouth 1 (One) Time., Disp: , Rfl:     chlorthalidone (HYGROTON) 25 MG tablet, Take 1 tablet by mouth Daily., Disp: , Rfl:     dabigatran etexilate (Pradaxa) 150 MG capsu, Take 1 capsule by mouth 2 (Two) Times a Day., Disp: 60 capsule, Rfl: 3    dapagliflozin (Farxiga) 5 MG tablet tablet, Take 1  "tablet by mouth Daily. Taking as samples right now, Disp: , Rfl:     Insulin Glargine, 2 Unit Dial, (Toujeo Max SoloStar) 300 UNIT/ML solution pen-injector injection, Inject 20 Units under the skin into the appropriate area as directed Daily., Disp: , Rfl:     latanoprost (XALATAN) 0.005 % ophthalmic solution, Administer 1 drop to both eyes Every Night., Disp: , Rfl:     Melatonin 10 MG capsule, Take  by mouth., Disp: , Rfl:     metoprolol succinate XL (TOPROL-XL) 50 MG 24 hr tablet, TAKE 2 TABLETS IN THE MORNING AND 1 TABLET IN THE EVENING FOR A TOTAL OF 3 TABLETS PER DAY, Disp: 270 tablet, Rfl: 3    potassium chloride (KAYCIEL) 20 mEq/15 mL solution, Take  by mouth Daily., Disp: , Rfl:       Review of Systems   Cardiovascular: Negative.    Respiratory: Negative.          Objective     /79   Pulse 63   Ht 160 cm (63\")   Wt 90.4 kg (199 lb 3.2 oz)   BMI 35.29 kg/m²       General Appearance:   well developed  well nourished  HENT:   oropharynx moist  lips not cyanotic  Neck:  thyroid not enlarged  supple  Respiratory:  no respiratory distress  normal breath sounds  no rales  Cardiovascular:  no jugular venous distention  regular rhythm  apical impulse normal  S1 normal, S2 normal  no S3, no S4   no murmur  no rub, no thrill  carotid pulses normal; no bruit  pedal pulses normal  lower extremity edema: none    Musculoskeletal:  no clubbing of fingers.   normocephalic, head atraumatic  Skin:   warm, dry  Psychiatric:  judgement and insight appropriate  normal mood and affect      Result Review :     The following data was reviewed by: MANUEL Thrasher on 07/21/2025:                     ECG 12 Lead    Date/Time: 7/21/2025 10:39 AM  Performed by: Erin Montoya APRN    Authorized by: VJ Jolly MD  Rhythm: sinus rhythm  Rate: normal  Conduction: conduction normal  ST Segments: ST segments normal  T Waves: T waves normal  QRS axis: normal  Other findings: non-specific ST-T wave " changes    Clinical impression: non-specific ECG            Rima Cardona  reports that she quit smoking about 29 years ago. Her smoking use included cigarettes. She has never used smokeless tobacco. I have educated her on the risk of diseases from using tobacco products such as cancer, COPD, and heart disease.              Assessment and Plan        ASSESSMENT:  Encounter Diagnoses   Name Primary?    PAF (paroxysmal atrial fibrillation) Yes    Hypertension, essential     Preop cardiovascular exam          PLAN:    1.  Paroxysmal atrial fibrillation, clinically maintaining sinus rhythm.  Continue beta-blocker and anticoagulation.  2.  Essential hypertension, elevated today.  She did not sleep well last night and was rushing this morning.  She will recheck later today.  Her blood pressure is consistently normal otherwise.  Continue current medical therapy.  3.  Low, stable cardiovascular risk for upcoming endoscopy.  No additional testing necessary.    Follow-up annually.      Patient was given instructions and counseling regarding her condition or for health maintenance advice. Please see specific information pulled into the AVS if appropriate.           Erin Montoya, APRN   7/21/2025  10:41 EDT

## 2025-07-22 RX ORDER — DABIGATRAN ETEXILATE 150 MG/1
150 CAPSULE ORAL EVERY 12 HOURS SCHEDULED
Qty: 180 CAPSULE | Refills: 3 | Status: SHIPPED | OUTPATIENT
Start: 2025-07-22